# Patient Record
Sex: FEMALE | URBAN - METROPOLITAN AREA
[De-identification: names, ages, dates, MRNs, and addresses within clinical notes are randomized per-mention and may not be internally consistent; named-entity substitution may affect disease eponyms.]

---

## 2019-04-08 ENCOUNTER — APPOINTMENT (RX ONLY)
Dept: URBAN - METROPOLITAN AREA CLINIC 184 | Facility: CLINIC | Age: 54
Setting detail: DERMATOLOGY
End: 2019-04-08

## 2019-04-08 VITALS — WEIGHT: 180 LBS | HEIGHT: 63 IN | RESPIRATION RATE: 12 BRPM

## 2019-04-08 DIAGNOSIS — D485 NEOPLASM OF UNCERTAIN BEHAVIOR OF SKIN: ICD-10-CM

## 2019-04-08 PROBLEM — D48.5 NEOPLASM OF UNCERTAIN BEHAVIOR OF SKIN: Status: ACTIVE | Noted: 2019-04-08

## 2019-04-08 PROBLEM — L70.0 ACNE VULGARIS: Status: ACTIVE | Noted: 2019-04-08

## 2019-04-08 PROBLEM — I10 ESSENTIAL (PRIMARY) HYPERTENSION: Status: ACTIVE | Noted: 2019-04-08

## 2019-04-08 PROCEDURE — ? BIOPSY BY SHAVE METHOD

## 2019-04-08 PROCEDURE — 11102 TANGNTL BX SKIN SINGLE LES: CPT

## 2019-04-08 PROCEDURE — ? COUNSELING

## 2019-04-08 ASSESSMENT — LOCATION ZONE DERM: LOCATION ZONE: FACE

## 2019-04-08 ASSESSMENT — LOCATION SIMPLE DESCRIPTION DERM: LOCATION SIMPLE: RIGHT CHEEK

## 2019-04-08 ASSESSMENT — LOCATION DETAILED DESCRIPTION DERM: LOCATION DETAILED: RIGHT SUPERIOR CENTRAL MALAR CHEEK

## 2019-04-08 NOTE — HPI: SKIN LESION (SKIN TAGS)
How Severe Are They?: mild
Is This A New Presentation, Or A Follow-Up?: Skin Lesion
Additional History: Patient mentions she’s tried home remedies, string tie method, skin tag ointment.